# Patient Record
(demographics unavailable — no encounter records)

---

## 2025-04-30 NOTE — ASSESSMENT
[FreeTextEntry1] : 55-year-old female, bilateral CMC arthritis -We discussed the etiology of the patients symptoms and reviewed  x-rays today.  We discussed the natural history of CMC arthritis and different forms of treatment.  We discussed that treatment for this condition involves symptomatic management.  We discussed that this is a problem that may wax and wane over time, and may be exacerbated by activity and use of the hand/wrist. -We discussed treatment options.  I recommend initial nonoperative management.  We discussed bracing to be worn with activity that frequently causes the symptoms.  She has a Comfort Cool brace.  I did give her a hand therapy consultation. -We discussed NSAIDs, we discussed associated GI side effects and ulceration is a possibility. We discussed steroid injection into the joint as a local anti-inflammatory.  We discussed there is a surgical procedure for recalcitrant cases. - We discussed meloxicam 7.5 mg and the patient will trial 10-day course. Patient advised not to take any NSAIDs at this time.  We discussed potential GI effects as well as kidney effects. Pain denies history of GI or kidney issues and will discontinue the medication immediately if she does notice stomach problems.  Advised to take with food. -She underwent right corticosteroid injection today, she deferred left if she heard symptoms improve on her right she will return for a left-sided injection

## 2025-04-30 NOTE — PHYSICAL EXAM
[de-identified] : Bilateral hand and wrist-Hand and digits warm well-perfused, no abrasion injury erythema redness or drainage -Denies numbness in the radial, ulnar, or median nerve distribution, full range of motion of the hand and wrist able to make a full composite fist. - tenderness over thumb CMC, positive CMC grind no hyperextension at MCP  no tenderness over thumb A1 pulley - neg Finkelstein's test, pain over the first dorsal compartment. [de-identified] : Three-view x-ray bilateral hand including AP lateral and oblique taken today and personally viewed these demonstrate osteophyte formation and degenerative changes at the basal joint of the thumb.  On the left wrist there is a calcification that is unchanged since prior films in 2022

## 2025-04-30 NOTE — PROCEDURE
[FreeTextEntry1] : Verbal consent given after informed discussion of risk benefits alternatives to the injection. Risks including infection, flare reaction, skin pigmentation changes, fatty atrophy discussed. The right first CMC joint palpated, marked and was prepped with alcohol.  The skin was anesthetized using ethyl chloride spray. Using 6 mg of betamethasone next with 1 cc of 1% lidocaine the right first CMC joint was injected.  There was no acute complications.  Patient advised to ice the area tonight and that the injection may take 48 to 72 hours to take effect. Advised to call with any questions concerns or worsening pain.

## 2025-04-30 NOTE — HISTORY OF PRESENT ILLNESS
[Right] : right hand dominant [FreeTextEntry1] : Patient is 55-year old right hand dominant female here for evaluation of her bilateral thumbs. She was diagnosed in 2022 with bilateral CMC arthritis. She has had a flare-up these last 3-weeks. She states she takes OTC anti-inflammatories and uses voltaren.  She has had significant pain limiting her daily activities.  It is significantly worse on her right than the left.  She has Comfort Cool brace that she is worn intermittently for the past few years.

## 2025-04-30 NOTE — PHYSICAL EXAM
[de-identified] : Bilateral hand and wrist-Hand and digits warm well-perfused, no abrasion injury erythema redness or drainage -Denies numbness in the radial, ulnar, or median nerve distribution, full range of motion of the hand and wrist able to make a full composite fist. - tenderness over thumb CMC, positive CMC grind no hyperextension at MCP  no tenderness over thumb A1 pulley - neg Finkelstein's test, pain over the first dorsal compartment. [de-identified] : Three-view x-ray bilateral hand including AP lateral and oblique taken today and personally viewed these demonstrate osteophyte formation and degenerative changes at the basal joint of the thumb.  On the left wrist there is a calcification that is unchanged since prior films in 2022